# Patient Record
Sex: FEMALE | Race: WHITE | NOT HISPANIC OR LATINO
[De-identification: names, ages, dates, MRNs, and addresses within clinical notes are randomized per-mention and may not be internally consistent; named-entity substitution may affect disease eponyms.]

---

## 2021-06-17 ENCOUNTER — NON-APPOINTMENT (OUTPATIENT)
Age: 21
End: 2021-06-17

## 2021-06-17 ENCOUNTER — APPOINTMENT (OUTPATIENT)
Dept: INTERNAL MEDICINE | Facility: CLINIC | Age: 21
End: 2021-06-17
Payer: COMMERCIAL

## 2021-06-17 VITALS
DIASTOLIC BLOOD PRESSURE: 60 MMHG | HEIGHT: 65 IN | BODY MASS INDEX: 22.66 KG/M2 | SYSTOLIC BLOOD PRESSURE: 100 MMHG | WEIGHT: 136 LBS

## 2021-06-17 DIAGNOSIS — Z00.00 ENCOUNTER FOR GENERAL ADULT MEDICAL EXAMINATION W/OUT ABNORMAL FINDINGS: ICD-10-CM

## 2021-06-17 DIAGNOSIS — Z82.3 FAMILY HISTORY OF STROKE: ICD-10-CM

## 2021-06-17 DIAGNOSIS — Z72.89 OTHER PROBLEMS RELATED TO LIFESTYLE: ICD-10-CM

## 2021-06-17 DIAGNOSIS — Z82.49 FAMILY HISTORY OF ISCHEMIC HEART DISEASE AND OTHER DISEASES OF THE CIRCULATORY SYSTEM: ICD-10-CM

## 2021-06-17 DIAGNOSIS — Z87.898 PERSONAL HISTORY OF OTHER SPECIFIED CONDITIONS: ICD-10-CM

## 2021-06-17 DIAGNOSIS — Z82.0 FAMILY HISTORY OF EPILEPSY AND OTHER DISEASES OF THE NERVOUS SYSTEM: ICD-10-CM

## 2021-06-17 DIAGNOSIS — Z01.84 ENCOUNTER FOR ANTIBODY RESPONSE EXAMINATION: ICD-10-CM

## 2021-06-17 DIAGNOSIS — Z87.891 PERSONAL HISTORY OF NICOTINE DEPENDENCE: ICD-10-CM

## 2021-06-17 LAB — CYTOLOGY CVX/VAG DOC THIN PREP: NORMAL

## 2021-06-17 PROCEDURE — 99385 PREV VISIT NEW AGE 18-39: CPT | Mod: 25

## 2021-06-17 PROCEDURE — 36415 COLL VENOUS BLD VENIPUNCTURE: CPT

## 2021-06-17 PROCEDURE — 93000 ELECTROCARDIOGRAM COMPLETE: CPT

## 2021-06-17 NOTE — HEALTH RISK ASSESSMENT
[Patient declined mammogram] : Patient declined mammogram [Patient reported PAP Smear was normal] : Patient reported PAP Smear was normal [Patient declined bone density test] : Patient declined bone density test [Patient declined colonoscopy] : Patient declined colonoscopy [HIV test declined] : HIV test declined [Hepatitis C test declined] : Hepatitis C test declined [Very Good] : ~his/her~  mood as very good [Yes] : Yes [2 - 4 times a month (2 pts)] : 2-4 times a month (2 points) [3 or 4 (1 pt)] : 3 or 4  (1 point) [Never (0 pts)] : Never (0 points) [No falls in past year] : Patient reported no falls in the past year [0] : 2) Feeling down, depressed, or hopeless: Not at all (0) [] : No [[id number]] : 0 [PapSmearDate] : 01/19 [PapSmearComments] : as per pt, exam performed with a doctor in [place], [place]

## 2021-06-17 NOTE — HISTORY OF PRESENT ILLNESS
[FreeTextEntry1] : Initial visit\par \par Annual exam [de-identified] : 20-year-old female presents for initial visit, annual exam. Her mom  2 years ago at age 49 from a stroke. Patient says she was unhealthy and a heavy smoker.\par She lives at home with her dad, older sister,and younger brother. Her dad owns his own business, a [employer], and patient occasionally works in the office. Other than that she says she doesn't do anything, she didn't go to college and right now she has no plans for the future. Her younger brother is a freshman at [employer] and loves it. Her older sister is not working either though she sometimes babysits and also has no plans at this time\par \par Her main complaint is some abdominal discomfort on and off loose stools, her diet is terrible, mostly fast food.\par \par She vapes nicotine but does not smoke cigarettes\par \par She's been to a gynecologist once, reports exam was normal, does not take any birth control.

## 2021-06-17 NOTE — PHYSICAL EXAM
[Normal Female:] : bladder was normal on palpation [Normal] : normal gait, coordination grossly intact, no focal deficits [FreeTextEntry1] : Deferred GI/GYN [de-identified] : Deferred GYN; Denies pain, lumps and discharge [de-identified] : No lymphadenopathy [de-identified] : Denies excessive thirst, urination, fatigue [de-identified] : No rash or skin lesion [de-identified] : Alert and Oriented x3.  Appropriate mood and affect

## 2021-06-18 LAB
ALBUMIN SERPL ELPH-MCNC: 4.8 G/DL
ALP BLD-CCNC: 84 U/L
ALT SERPL-CCNC: 15 U/L
ANION GAP SERPL CALC-SCNC: 14 MMOL/L
APPEARANCE: CLEAR
AST SERPL-CCNC: 16 U/L
BASOPHILS # BLD AUTO: 0.05 K/UL
BASOPHILS NFR BLD AUTO: 0.7 %
BILIRUB SERPL-MCNC: 0.2 MG/DL
BILIRUBIN URINE: NEGATIVE
BLOOD URINE: NEGATIVE
BUN SERPL-MCNC: 11 MG/DL
CALCIUM SERPL-MCNC: 10.1 MG/DL
CHLORIDE SERPL-SCNC: 103 MMOL/L
CHOLEST SERPL-MCNC: 203 MG/DL
CO2 SERPL-SCNC: 23 MMOL/L
COLOR: NORMAL
CREAT SERPL-MCNC: 0.76 MG/DL
EOSINOPHIL # BLD AUTO: 0.12 K/UL
EOSINOPHIL NFR BLD AUTO: 1.6 %
FOLATE SERPL-MCNC: 9.8 NG/ML
GLUCOSE QUALITATIVE U: NEGATIVE
GLUCOSE SERPL-MCNC: 97 MG/DL
HCT VFR BLD CALC: 42.8 %
HDLC SERPL-MCNC: 69 MG/DL
HGB BLD-MCNC: 13.9 G/DL
IMM GRANULOCYTES NFR BLD AUTO: 0.3 %
KETONES URINE: NEGATIVE
LDLC SERPL CALC-MCNC: 119 MG/DL
LEUKOCYTE ESTERASE URINE: ABNORMAL
LYMPHOCYTES # BLD AUTO: 2.7 K/UL
LYMPHOCYTES NFR BLD AUTO: 35.3 %
MAN DIFF?: NORMAL
MCHC RBC-ENTMCNC: 31.2 PG
MCHC RBC-ENTMCNC: 32.5 GM/DL
MCV RBC AUTO: 96.2 FL
MONOCYTES # BLD AUTO: 0.53 K/UL
MONOCYTES NFR BLD AUTO: 6.9 %
NEUTROPHILS # BLD AUTO: 4.23 K/UL
NEUTROPHILS NFR BLD AUTO: 55.2 %
NITRITE URINE: NEGATIVE
NONHDLC SERPL-MCNC: 134 MG/DL
PH URINE: 7
PLATELET # BLD AUTO: 275 K/UL
POTASSIUM SERPL-SCNC: 4.6 MMOL/L
PROT SERPL-MCNC: 7.2 G/DL
PROTEIN URINE: NEGATIVE
RBC # BLD: 4.45 M/UL
RBC # FLD: 12 %
SODIUM SERPL-SCNC: 139 MMOL/L
SPECIFIC GRAVITY URINE: 1.01
T4 FREE SERPL-MCNC: 1 NG/DL
TRIGL SERPL-MCNC: 74 MG/DL
TSH SERPL-ACNC: 2.51 UIU/ML
UROBILINOGEN URINE: NORMAL
VIT B12 SERPL-MCNC: 461 PG/ML
WBC # FLD AUTO: 7.65 K/UL

## 2021-06-19 LAB
HBV SURFACE AB SER QL: REACTIVE
HEPATITIS A IGG ANTIBODY: NONREACTIVE
MEV IGG FLD QL IA: 151 AU/ML
MEV IGG+IGM SER-IMP: POSITIVE
MUV AB SER-ACNC: POSITIVE
MUV IGG SER QL IA: 21.7 AU/ML
RUBV IGG FLD-ACNC: 2.1 INDEX
RUBV IGG SER-IMP: POSITIVE
VZV AB TITR SER: POSITIVE
VZV IGG SER IF-ACNC: 328.7 INDEX

## 2022-04-26 ENCOUNTER — APPOINTMENT (OUTPATIENT)
Dept: FAMILY MEDICINE | Facility: CLINIC | Age: 22
End: 2022-04-26
Payer: COMMERCIAL

## 2022-04-26 VITALS
SYSTOLIC BLOOD PRESSURE: 94 MMHG | WEIGHT: 135 LBS | BODY MASS INDEX: 22.49 KG/M2 | RESPIRATION RATE: 18 BRPM | TEMPERATURE: 100.4 F | OXYGEN SATURATION: 100 % | HEIGHT: 65 IN | DIASTOLIC BLOOD PRESSURE: 60 MMHG | HEART RATE: 104 BPM

## 2022-04-26 PROCEDURE — 99213 OFFICE O/P EST LOW 20 MIN: CPT | Mod: 25

## 2022-04-26 NOTE — HISTORY OF PRESENT ILLNESS
[FreeTextEntry8] : 21 year old female presenting with throat pain for 2 days. Patient reports initially having only sore throat but now has ear ache as well. Had some blood with the phlegm last night. Denies being around anyone sick. Is vaccinated. Denies fever or chills at home, no body ache.

## 2022-04-27 LAB
INFLUENZA A RESULT: NOT DETECTED
INFLUENZA B RESULT: NOT DETECTED
RESP SYN VIRUS RESULT: NOT DETECTED
SARS-COV-2 RESULT: NOT DETECTED

## 2022-07-08 ENCOUNTER — NON-APPOINTMENT (OUTPATIENT)
Age: 22
End: 2022-07-08

## 2022-07-08 ENCOUNTER — APPOINTMENT (OUTPATIENT)
Dept: FAMILY MEDICINE | Facility: CLINIC | Age: 22
End: 2022-07-08

## 2022-07-08 VITALS
RESPIRATION RATE: 16 BRPM | OXYGEN SATURATION: 100 % | SYSTOLIC BLOOD PRESSURE: 104 MMHG | WEIGHT: 131 LBS | TEMPERATURE: 98.8 F | DIASTOLIC BLOOD PRESSURE: 68 MMHG | HEART RATE: 81 BPM

## 2022-07-08 PROCEDURE — 99213 OFFICE O/P EST LOW 20 MIN: CPT

## 2022-07-08 NOTE — HISTORY OF PRESENT ILLNESS
[FreeTextEntry8] : 21 year old female presenting with sore throat and congestion for a week. Patient reports she's done home covid and it was negative. She's had covid in april and improved since then. Recently her cousins had the same symptoms as her. She's worried and would like to get tested. denies fever, chills, chest pain, sob, nausea, vomiting, body ache.

## 2022-07-12 LAB
RAPID RVP RESULT: DETECTED
RV+EV RNA SPEC QL NAA+PROBE: DETECTED
SARS-COV-2 RNA PNL RESP NAA+PROBE: NOT DETECTED

## 2023-11-08 ENCOUNTER — APPOINTMENT (OUTPATIENT)
Dept: FAMILY MEDICINE | Facility: CLINIC | Age: 23
End: 2023-11-08
Payer: COMMERCIAL

## 2023-11-08 VITALS
BODY MASS INDEX: 23.04 KG/M2 | SYSTOLIC BLOOD PRESSURE: 110 MMHG | TEMPERATURE: 99 F | WEIGHT: 130 LBS | OXYGEN SATURATION: 99 % | HEART RATE: 102 BPM | DIASTOLIC BLOOD PRESSURE: 70 MMHG | HEIGHT: 63 IN

## 2023-11-08 DIAGNOSIS — Z30.09 ENCOUNTER FOR OTHER GENERAL COUNSELING AND ADVICE ON CONTRACEPTION: ICD-10-CM

## 2023-11-08 DIAGNOSIS — Z11.3 ENCOUNTER FOR SCREENING FOR INFECTIONS WITH A PREDOMINANTLY SEXUAL MODE OF TRANSMISSION: ICD-10-CM

## 2023-11-08 DIAGNOSIS — J02.9 ACUTE PHARYNGITIS, UNSPECIFIED: ICD-10-CM

## 2023-11-08 PROCEDURE — 99213 OFFICE O/P EST LOW 20 MIN: CPT | Mod: 25

## 2023-11-08 PROCEDURE — 81025 URINE PREGNANCY TEST: CPT

## 2023-11-10 DIAGNOSIS — A74.9 CHLAMYDIAL INFECTION, UNSPECIFIED: ICD-10-CM

## 2023-11-10 LAB
C TRACH RRNA SPEC QL NAA+PROBE: DETECTED
N GONORRHOEA RRNA SPEC QL NAA+PROBE: NOT DETECTED
SOURCE AMPLIFICATION: NORMAL

## 2023-11-10 RX ORDER — DOXYCYCLINE HYCLATE 100 MG/1
100 TABLET ORAL TWICE DAILY
Qty: 14 | Refills: 0 | Status: COMPLETED | COMMUNITY
Start: 2023-11-10 | End: 2023-11-17